# Patient Record
Sex: MALE | Employment: FULL TIME | URBAN - METROPOLITAN AREA
[De-identification: names, ages, dates, MRNs, and addresses within clinical notes are randomized per-mention and may not be internally consistent; named-entity substitution may affect disease eponyms.]

---

## 2019-12-09 ENCOUNTER — HOSPITAL ENCOUNTER (EMERGENCY)
Facility: CLINIC | Age: 39
Discharge: HOME | End: 2019-12-09
Attending: EMERGENCY MEDICINE | Admitting: EMERGENCY MEDICINE
Payer: COMMERCIAL

## 2019-12-09 VITALS
RESPIRATION RATE: 22 BRPM | DIASTOLIC BLOOD PRESSURE: 96 MMHG | SYSTOLIC BLOOD PRESSURE: 143 MMHG | TEMPERATURE: 97.8 F | BODY MASS INDEX: 26.66 KG/M2 | HEIGHT: 69 IN | OXYGEN SATURATION: 99 % | HEART RATE: 84 BPM | WEIGHT: 180 LBS

## 2019-12-09 DIAGNOSIS — H81.10 BENIGN PAROXYSMAL POSITIONAL VERTIGO, UNSPECIFIED LATERALITY: ICD-10-CM

## 2019-12-09 PROCEDURE — 25000132 ZZH RX MED GY IP 250 OP 250 PS 637: Performed by: EMERGENCY MEDICINE

## 2019-12-09 PROCEDURE — 99283 EMERGENCY DEPT VISIT LOW MDM: CPT

## 2019-12-09 PROCEDURE — 93005 ELECTROCARDIOGRAM TRACING: CPT

## 2019-12-09 RX ORDER — MECLIZINE HYDROCHLORIDE 25 MG/1
25 TABLET ORAL ONCE
Status: COMPLETED | OUTPATIENT
Start: 2019-12-09 | End: 2019-12-09

## 2019-12-09 RX ORDER — ONDANSETRON 4 MG/1
4 TABLET, ORALLY DISINTEGRATING ORAL EVERY 4 HOURS PRN
Qty: 10 TABLET | Refills: 0 | Status: SHIPPED | OUTPATIENT
Start: 2019-12-09 | End: 2019-12-12

## 2019-12-09 RX ORDER — MECLIZINE HYDROCHLORIDE 25 MG/1
25 TABLET ORAL 3 TIMES DAILY PRN
Qty: 20 TABLET | Refills: 0 | Status: SHIPPED | OUTPATIENT
Start: 2019-12-09

## 2019-12-09 RX ADMIN — MECLIZINE HYDROCHLORIDE 25 MG: 25 TABLET ORAL at 12:34

## 2019-12-09 SDOH — HEALTH STABILITY: MENTAL HEALTH: HOW OFTEN DO YOU HAVE A DRINK CONTAINING ALCOHOL?: NEVER

## 2019-12-09 ASSESSMENT — ENCOUNTER SYMPTOMS
TROUBLE SWALLOWING: 0
NAUSEA: 0
NUMBNESS: 0
WEAKNESS: 0
DIZZINESS: 1
SPEECH DIFFICULTY: 0
DIAPHORESIS: 1
HEADACHES: 0
COUGH: 0

## 2019-12-09 ASSESSMENT — MIFFLIN-ST. JEOR: SCORE: 1716.85

## 2019-12-09 NOTE — ED AVS SNAPSHOT
Emergency Department  [address] [place] [address]  Phone:  [phone]  Fax:  [phone]                                    Anil Mendez   MRN: [id number]    Department:   Emergency Department   Date of Visit:  12/9/2019           After Visit Summary Signature Page    I have received my discharge instructions, and my questions have been answered. I have discussed any challenges I see with this plan with the nurse or doctor.    ..........................................................................................................................................  Patient/Patient Representative Signature      ..........................................................................................................................................  Patient Representative Print Name and Relationship to Patient    ..................................................               ................................................  Date                                   Time    ..........................................................................................................................................  Reviewed by Signature/Title    ...................................................              ..............................................  Date                                               Time          22EPIC Rev 08/18

## 2019-12-09 NOTE — ED TRIAGE NOTES
Sudden onset of intermittent dizziness and lightheadedness started this morning. No HA. No weakness. Slight nausea. Difficulty walking due to dizziness. Normal vision. No facial droop.

## 2019-12-09 NOTE — ED PROVIDER NOTES
"  History     Chief Complaint:  Dizziness    HPI   Anil Mendez is a 39 year old male who presents with dizziness. The patient states that he started feeling dizzy early this morning while driving to work this morning The patient states that at work he started to feel intermittent dizziness every few minutes while at work with associated nausea and diaphoresis The patient states that he could not look at the monitor as it was moving. He states that if he turns his head to the side the dizziness seems to become worse. His coworker states that the patient came into his office and was pale, so they came to the ED. The patient states that this has never happened before. The patient denies tinnitus, double vision, numbness, weakness, speech difficulty, swallowing difficulty, cough, cold like symptoms, or headache. The patient denies recent travel.     Allergies:  Shrimp    Medications:    Medications reviewed. No current medications.     Past Medical History:    Acid reflex    Past Surgical History:    Surgical history reviewed. No pertinent surgical history.    Family History:    Diabetes  Hypertension    Social History:  The patient was accompanied to the ED by coworker.  Smoking Status: Never Smoker  Alcohol Use: None  Drug Use: None     Review of Systems   Constitutional: Positive for diaphoresis.   HENT: Negative for tinnitus and trouble swallowing.    Eyes: Negative for visual disturbance.   Respiratory: Negative for cough.    Gastrointestinal: Negative for nausea.   Skin: Positive for pallor.   Neurological: Positive for dizziness. Negative for speech difficulty, weakness, numbness and headaches.   All other systems reviewed and are negative.    Physical Exam     Patient Vitals for the past 24 hrs:   BP Temp Temp src Pulse Resp SpO2 Height Weight   12/09/19 1000 (!) 143/96 -- -- -- -- -- -- --   12/09/19 0946 (!) 143/70 97.8  F (36.6  C) Oral 84 22 99 % 1.753 m (5' 9\") 81.6 kg (180 lb)        Physical " Exam  Constitutional:  male, sitting  HENT: No signs of trauma. Tympanic membranes clear bilaterally.   Eyes: EOM are normal. Pupils are 4mm, round, and reactive to light. No carotid bruit. No nystagmus.   Neck: Normal range of motion. No JVD present. No cervical adenopathy.  Cardiovascular: Regular rhythm.  Exam reveals no gallop and no friction rub.    No murmur heard.  Pulmonary/Chest: Bilateral breath sounds normal. No wheezes, rhonchi or rales.  Abdominal: Soft. No tenderness. No rebound or guarding.   Musculoskeletal: No edema. No tenderness.   Lymphadenopathy: No lymphadenopathy.   Neurological: Alert and oriented to person, place, and time. Normal strength. Coordination normal. Fluent speech. No facial asymmetry. Normal sensation. Normal gait.   Skin: Skin is warm and dry. No rash noted. No erythema.     Emergency Department Course   ECG:  Normal sinus rhythm  Normal ECG  Rate 83 bpm. HI interval 142 ms. QRS duration 94 ms. QT/QTc 382/448 ms. P-R-T axes 75 80 54.    Interventions:  1234 Meclizine 25 mg Oral    Emergency Department Course:  Nursing notes and vitals reviewed.    0956 Orthostatics obtained:    Standing Orthostatics: Standing Orthostatic BP: 143/96  Standing Orthostatic Pulse: 84 bpm   Lying Orthostatic BP:  Lying Orthostatic BP: 138/82 Lying Orthostatic Pulse: 82 bpm    1120 I performed an exam of the patient as documented above.     1237 I returned to update the patient.     Findings and plan explained to the Patient. Patient discharged home with instructions regarding supportive care, medications, and reasons to return. The importance of close follow-up was reviewed. The patient was prescribed Meclizine and Zofran.     Impression & Plan    Medical Decision Making:  This is a 39 year old male who was at work today when he developed spinning type dizziness with his computer screen moving to the right. When he turned his head to either side this worsened his symptoms. The patient  denies headache, visual problems such as double vision, swallowing or speaking issues. He denied focal numbness or weakness but states it was hard to walk stating he got sweaty with this. The patient denies tinnitus, change in hearing, or recent cold symptoms. He has not had this before. He does not have any risk factors for stroke. The patient states he is much improved and his exam is essentially unremarkable. If he turns his head significantly to one side or other it seems to bring some of his symptomology back. The patient did receive meclizine which took that away. He also had an EKG which showed normal sinus rhythm with normal intervals. The patient's symptoms are consistent with peripheral vertigo. This is unlikely to be stroke. He will be given Zofran and Meclizine to use at home. I have referred him to follow up with Dr. Cardenas if symptoms persist.      Diagnosis:    ICD-10-CM    1. Benign paroxysmal positional vertigo, unspecified laterality H81.10        Disposition:   The patient is discharged to home.     Discharge Medications:  New Prescriptions    MECLIZINE (ANTIVERT) 25 MG TABLET    Take 1 tablet (25 mg) by mouth 3 times daily as needed for dizziness    ONDANSETRON (ZOFRAN ODT) 4 MG ODT TAB    Take 1 tablet (4 mg) by mouth every 4 hours as needed for nausea     Scribe Disclosure:  NATALEE, Olamide Hoover, am serving as a scribe at 10:30 AM on 12/9/2019 to document services personally performed by Rainer Garcia MD based on my observations and the provider's statements to me.    EMERGENCY DEPARTMENT       Rainer Garcia MD  12/09/19 9284